# Patient Record
Sex: FEMALE | Race: WHITE | Employment: FULL TIME | URBAN - METROPOLITAN AREA
[De-identification: names, ages, dates, MRNs, and addresses within clinical notes are randomized per-mention and may not be internally consistent; named-entity substitution may affect disease eponyms.]

---

## 2016-10-04 LAB
CHLAMYDIA, EXTERNAL: NEGATIVE
HBSAG, EXTERNAL: NEGATIVE
HIV, EXTERNAL: NEGATIVE
N. GONORRHEA, EXTERNAL: NEGATIVE
RPR, EXTERNAL: NON REACTIVE
RUBELLA, EXTERNAL: NEGATIVE
TYPE, ABO & RH, EXTERNAL: NORMAL

## 2017-04-18 LAB — GRBS, EXTERNAL: NEGATIVE

## 2017-05-09 ENCOUNTER — HOSPITAL ENCOUNTER (INPATIENT)
Age: 27
LOS: 2 days | Discharge: HOME | End: 2017-05-12
Attending: OBSTETRICS & GYNECOLOGY | Admitting: OBSTETRICS & GYNECOLOGY
Payer: COMMERCIAL

## 2017-05-09 PROCEDURE — 99281 EMR DPT VST MAYX REQ PHY/QHP: CPT

## 2017-05-09 RX ORDER — GINKGO BILOBA LEAF EXTRACT 60 MG
CAPSULE ORAL
COMMUNITY

## 2017-05-09 NOTE — IP AVS SNAPSHOT
[address] [place] [address] 
[phone] Patient: Og No MRN: [id number] ICN:[id number] You are allergic to the following No active allergies Recent Documentation Height Weight Breastfeeding? BMI OB Status Smoking Status 1.524 m 62.6 kg Yes 26.95 kg/m2 Recent pregnancy Never Smoker Unresulted Labs Order Current Status TYPE & SCREEN Preliminary result Emergency Contacts Name Discharge Info Relation Home Work Mobile Michael Jeter DISCHARGE CAREGIVER [3] Spouse [3]   [phone] About your hospitalization You were admitted on:  May 9, 2017 You last received care in the:  [address] [place] [address] You were discharged on:  May 12, 2017 Unit phone number:  [phone] Why you were hospitalized Your primary diagnosis was:  Not on File Your diagnoses also included:  Pregnancy, Normal Labor And Delivery, Postpartum Care And Examination Providers Seen During Your Hospitalizations Provider Role Specialty Primary office phone Sue Hernandez MD Attending Provider Obstetrics & Gynecology [phone] Noemi Moreno MD Attending Provider Obstetrics & Gynecology [phone] Your Primary Care Physician (PCP) Primary Care Physician Office Phone Office Fax Wanda Juan, [address] [place] [address] [phone] Follow-up Information Follow up With Details Comments Contact Info [place]   [id number] [address] [place] [address] 
[phone] Current Discharge Medication List  
  
START taking these medications Dose & Instructions Dispensing Information Comments Morning Noon Evening Bedtime  
 ibuprofen 800 mg tablet Commonly known as:  MOTRIN Your last dose was: Your next dose is:    
   
   
 Dose:  800 mg Take 1 Tab by mouth every eight (8) hours as needed. Quantity:  50 Tab Refills:  0 CONTINUE these medications which have NOT CHANGED Dose & Instructions Dispensing Information Comments Morning Noon Evening Bedtime PRENATAL PO Your last dose was: Your next dose is: Take  by mouth. Refills:  0 ASK your doctor about these medications Dose & Instructions Dispensing Information Comments Morning Noon Evening Bedtime EVENING PRIMROSE 500 mg Cap Generic drug:  evening primrose oil Your last dose was: Your next dose is: Take  by mouth. Refills:  0 Where to Get Your Medications Information on where to get these meds will be given to you by the nurse or doctor. ! Ask your nurse or doctor about these medications  
  ibuprofen 800 mg tablet Discharge Instructions POST DELIVERY DISCHARGE INSTRUCTIONS Name: Tay Aguilar YOB: 1990 Primary Diagnosis: Active Problems: 
  Pregnancy (5/10/2017) Normal labor and delivery (5/11/2017) Postpartum care and examination (5/11/2017) General:  
 
Diet/Diet Restrictions: 
Eight 8-ounce glasses of fluid daily (water, juices); avoid excessive caffeine intake. Meals/snacks as desired which are high in fiber and carbohydrates and low in fat and cholesterol. Physical Activity / Restrictions / Safety:  
 
Avoid heavy lifting, no more than the baby alone (not the baby in the car seat). Avoid intercourse until you are seen at your postpartum visit. No douching or tampon use. Check with obstetrician before starting or resuming an exercise program.    
 
 
Discharge Instructions/Special Treatment/Home Care Needs:  
 
Continue prenatal vitamins. Continue to use squirt bottle with warm water on your perineum after each bathroom use until bleeding stops. Call your doctor for the following: Fever over 101 degrees by mouth. Vaginal bleeding that soaks two pads per hour for more than one hour. Red streaks or increased swelling of legs, painful red streaks on your breast. 
If you feel extremely anxious or overwhelmed. If you have thoughts of harming yourself and/or your baby. Pain Management:  
 
Pain Management:  
Take Acetaminophen (Tylenol) or Ibuprofen (Advil, Motrin), as directed for pain. Use a warm Sitz bath 3 times daily to relieve perineal or hemorrhoidal discomfort. For hemorrhoidal discomfort, use Tucks and Anusol cream as needed and directed. Follow-Up Care:  
 
Appointment with MD: Follow-up Appointments Procedures  FOLLOW UP VISIT Appointment in: 6 Weeks Standing Status:   Standing Number of Occurrences:   1 Order Specific Question:   Appointment in Answer:   6 Weeks Telephone number: [phone] Signed By: Paulina Pillai CNM Discharge Instructions Attachments/References BREASTFEEDING MOTHERS: NUTRITION (ENGLISH) SAFE SLEEP AND SUDDEN INFANT DEATH SYNDROME (SIDS): PEDIATRIC: GENERAL INFO (ENGLISH) PARENTING: STRESS AND INFANTS (ENGLISH) POSTPARTUM CARE (ENGLISH) DEPRESSION: POSTPARTUM (ENGLISH) Discharge Orders None [employer] Announcement We are excited to announce that we are making your provider's discharge notes available to you in [employer]. You will see these notes when they are completed and signed by the physician that discharged you from your recent hospital stay. If you have any questions or concerns about any information you see in S[employer], please call the Health Information Department where you were seen or reach out to your Primary Care Provider for more information about your plan of care. Introducing [facility] SERVICES!    
 Ashly Tijerina introduces [employer] patient portal. Now you can access parts of your medical record, email your doctor's office, and request medication refills online. 1. In your internet browser, go to [email or web address] 2. Click on the First Time User? Click Here link in the Sign In box. You will see the New Member Sign Up page. 3. Enter your [employer] [place] Access Code exactly as it appears below. You will not need to use this code after youve completed the sign-up process. If you do not sign up before the expiration date, you must request a new code. · [employer] [place] Access Code: [email or web address] Expires: 7/2/2017  1:25 PM 
 
4. Enter the last four digits of your Social Security Number (xxxx) and Date of Birth (mm/dd/yyyy) as indicated and click Submit. You will be taken to the next sign-up page. 5. Create a [employer] [place] ID. This will be your [employer] [place] login ID and cannot be changed, so think of one that is secure and easy to remember. 6. Create a [employer] [place] password. You can change your password at any time. 7. Enter your Password Reset Question and Answer. This can be used at a later time if you forget your password. 8. Enter your e-mail address. You will receive e-mail notification when new information is available in [address] 9. Click Sign Up. You can now view and download portions of your medical record. 10. Click the Download Summary menu link to download a portable copy of your medical information. If you have questions, please visit the Frequently Asked Questions section of the [employer] [place] website. Remember, [employer] [place] is NOT to be used for urgent needs. For medical emergencies, dial 911. Now available from your iPhone and Android! General Information Please provide this summary of care documentation to your next provider. Patient Signature:  ____________________________________________________________ Date:  ____________________________________________________________  
  
Mario Alberto Balderrama  Provider Signature: ____________________________________________________________ Date:  ____________________________________________________________ More Information Nutrition for Breastfeeding Mothers: Care Instructions Your Care Instructions When a woman breastfeeds her baby, she needs more nutrients to keep herself healthy and to make the baby's milk. Breastfeeding helps build the bond between you and your baby. It gives your baby excellent health benefits. A healthy diet includes eating a variety of foods from the basic food groups: grains, vegetables, fruits, milk and milk products (such as cheese and yogurt), and meat and dried beans. Eating well during breastfeeding will ensure that you stay healthy and your baby grows and develops normally. Follow-up care is a key part of your treatment and safety. Be sure to make and go to all appointments, and call your doctor if you are having problems. It's also a good idea to know your test results and keep a list of the medicines you take. How can you care for yourself at home? · Include 3 to 4 cups of nonfat or low-fat milk or milk products in your diet every day. These include: ¨ Milk (8 ounces equals 1 cup). ¨ Ice cream (1½ cups equals 1 cup of milk). ¨ Cheese (1½ ounces of cheese equals 1 cup). ¨ Yogurt (8 ounces equals 1 cup). · Eat at least 7 ounces of grains, such as cereals, breads, crackers, rice, or pasta, every day. One ounce is about 1 slice of bread, 1 cup of breakfast cereal, or ½ cup of cooked rice, cereal, or pasta. · Eat 3 cups of vegetables each day. Choices include: ¨ Dark-green vegetables such as broccoli and spinach. ¨ Orange vegetables such as carrots and sweet potatoes. ¨ Dried beans (such as sorenson and kidney beans) and peas (such as lentils). · Every day, eat 2 cups of fresh, frozen, or canned fruit.  
· Eat 6½ ounces each day of protein, such as chicken, fish, lean meat, eggs, peanut butter, dried beans and peas, nuts, and seeds. One egg, 1 tablespoon of peanut butter, or ½ ounce of nuts or seeds equals 1 ounce of protein. A ½ cup of cooked beans equals 2 ounces of protein. · Drink plenty of fluids, enough so that your urine is light yellow or clear like water. If you have kidney, heart, or liver disease and have to limit fluids, talk with your doctor before you increase the amount of fluids you drink. · Limit caffeine products, such as coffee, tea, chocolate, and some sodas. Caffeine can pass to your baby through breast milk. It may cause fussiness and sleep problems in babies. · Your doctor may recommend a vitamin supplement. Take it as recommended. · Consider joining a breastfeeding support group. These are offered at many hospitals and birthing centers by nurses, nurse-midwives, or lactation consultants. When should you call for help? Watch closely for changes in your health, and be sure to contact your doctor if: 
· You feel that you are not making enough milk for your baby. · You are losing a lot of weight. · You do not think your baby is gaining enough weight. · You would like help to plan a healthy diet. Where can you learn more? Go to http://dano-yassine.info/. Enter P234 in the search box to learn more about \"Nutrition for Breastfeeding Mothers: Care Instructions. \" Current as of: May 30, 2016 Content Version: 11.2 © 4037-8548 [employer]Rete. Care instructions adapted under license by [employer] (which disclaims liability or warranty for this information). If you have questions about a medical condition or this instruction, always ask your healthcare professional. [place] [address] any warranty or liability for your use of this information. Learning About Safe Sleep for Babies Why is safe sleep important?  
Enjoy your time with your baby, and know that you can do a few things to keep your baby safe. Following safe sleep guidelines can help prevent sudden infant death syndrome (SIDS) and reduce other sleep-related risks. SIDS is the death of a baby younger than 1 year with no known cause. Talk about these safety steps with your  providers, family, friends, and anyone else who spends time with your baby. Explain in detail what you expect them to do. Do not assume that people who care for your baby know these guidelines. What are the tips for safe sleep? Putting your baby to sleep · Put your baby to sleep on his or her back, not on the side or tummy. This reduces the risk of SIDS. · Once your baby learns to roll from the back to the belly, you do not need to keep shifting your baby onto his or her back. But keep putting your baby down to sleep on his or her back. · Keep the room at a comfortable temperature so that your baby can sleep in lightweight clothes without a blanket. Usually, the temperature is about right if an adult can wear a long-sleeved T-shirt and pants without feeling cold. Make sure that your baby doesn't get too warm. Your baby is likely too warm if he or she sweats or tosses and turns a lot. · Consider offering your baby a pacifier at nap time and bedtime if your doctor agrees. · The American Academy of Pediatrics recommends that you do not sleep with your baby in the bed with you. · When your baby is awake and someone is watching, allow your baby to spend some time on his or her belly. This helps your baby get strong and may help prevent flat spots on the back of the head. Cribs, cradles, bassinets, and bedding · For the first 6 months, have your baby sleep in a crib, cradle, or bassinet in the same room where you sleep. · Keep soft items and loose bedding out of the crib. Items such as blankets, stuffed animals, toys, and pillows could block your baby's mouth or trap your baby. Dress your baby in sleepers instead of using blankets. · Make sure that your baby's crib has a firm mattress (with a fitted sheet). Don't use bumper pads or other products that attach to crib slats or sides. They could block your baby's mouth or trap your baby. · Do not place your baby in a car seat, sling, swing, bouncer, or stroller to sleep. The safest place for a baby is in a crib, cradle, or bassinet that meets safety standards. What else is important to know? More about sudden infant death syndrome (SIDS) SIDS is very rare. In most cases, a parent or other caregiver puts the babywho seems healthydown to sleep and returns later to find that the baby has . No one is at fault when a baby dies of SIDS. A SIDS death cannot be predicted, and in many cases it cannot be prevented. Doctors do not know what causes SIDS. It seems to happen more often in premature and low-birth-weight babies. It also is seen more often in babies whose mothers did not get medical care during the pregnancy and in babies whose mothers smoke. Do not smoke or let anyone else smoke in the house or around your baby. Exposure to smoke increases the risk of SIDS. If you need help quitting, talk to your doctor about stop-smoking programs and medicines. These can increase your chances of quitting for good. Breastfeeding your child may help prevent SIDS. Be wary of products that are billed as helping prevent SIDS. Talk to your doctor before buying any product that claims to reduce SIDS risk. What to do while still pregnant · See your doctor regularly. Women who see a doctor early in and throughout their pregnancies are less likely to have babies who die of SIDS. · Eat a healthy, balanced diet, which can help prevent a premature baby or a baby with a low birth weight. · Do not smoke or let anyone else smoke in the house or around you. Smoking or exposure to smoke during pregnancy increases the risk of SIDS.  If you need help quitting, talk to your doctor about stop-smoking programs and medicines. These can increase your chances of quitting for good. · Do not drink alcohol or take illegal drugs. Alcohol or drug use may cause your baby to be born early. Follow-up care is a key part of your child's treatment and safety. Be sure to make and go to all appointments, and call your doctor if your child is having problems. It's also a good idea to know your child's test results and keep a list of the medicines your child takes. Where can you learn more? Go to http://dano-yassine.info/. Enter K072 in the search box to learn more about \"Learning About Safe Sleep for Babies. \" Current as of: July 26, 2016 Content Version: 11.2 © 8461-5091 [employer]. Care instructions adapted under license by [employer] (which disclaims liability or warranty for this information). If you have questions about a medical condition or this instruction, always ask your healthcare professional. [place] [address] any warranty or liability for your use of this information. Stress in Parents of Infants: Care Instructions Your Care Instructions Meeting the increased demands of being a new parent can be a big challenge. It is easy to get overtired and overwhelmed during the first weeks. What used to be a simple chore, such as buying groceries, is not so simple now. Plus, you have new chores, including feeding and changing your new baby. At the end of the day, you may be so tired that you feel like crying. Instead of looking forward to the next day, you may be dreading tomorrow. Like many new parents, you are burned out from the stress of having a new baby. Stress affects each of us differently, and the most effective ways to relieve it are different for each person. You can try different methods to find out which ones work best for you. As the weeks go by, you will begin to develop a rhythm with your baby.  Tasks that now seem to take forever will become easier. Many women get the \"baby blues\" during the first few days after childbirth. If you are a new mother and the \"baby blues\" last more than a few days, call your doctor right away. Depression is a medical condition that requires treatment. Follow-up care is a key part of your treatment and safety. Be sure to make and go to all appointments, and call your doctor if you are having problems. It's also a good idea to know your test results and keep a list of the medicines you take. How can you care for yourself at home? · Be kind to yourself. Your new baby takes a lot of work, but he or she can give you a lot of pleasure too. Do not worry about housekeeping for a while. · Allow your friends to bring you meals or do chores. · Limit visitors to as few as you feel you can handle, or ask them not to visit for a while. Before they come, set a limit on how long they will stay. · Sleep when your baby sleeps. Even a short nap helps. · Find what triggers your stress, and avoid those things as much as you can. · If you breastfeed, learn how to collect and store some breast milk so your partner or  can feed the baby while you sleep. · Eat a balanced diet so you can keep up your energy. · Drink plenty of fluids throughout the day. · Avoid caffeine and alcohol. Caffeine is found in coffee, tea, cola drinks, chocolate, and other foods. · Limit medicines that can make you more tired, such as tranquilizers and cold and allergy medicines. · Get regular daily exercise, such as walks, to help improve your mood. Rest after you exercise. · Be honest with yourself and those who care about you. Tell them you are stressed and tired. · Talking to other new parents can help. Ask your doctor or child's doctor to suggest support groups for new parents. Hearing that someone else is having the same experiences you are can help a lot.  
· If you have the baby blues for more than a few days, call your doctor right away. When should you call for help? Call 911 anytime you think you may need emergency care. For example, call if: 
· You have thoughts of hurting yourself, your baby, or another person. Call your doctor now or seek immediate medical care if: 
· You are having trouble caring for yourself or your baby. Watch closely for changes in your health, and be sure to contact your doctor if you have any problems. Where can you learn more? Go to http://dano-yassine.info/. Enter H142 in the search box to learn more about \"Stress in Parents of Infants: Care Instructions. \" Current as of: 2016 Content Version: 11.2 © 4341-4813 [employer]. Care instructions adapted under license by [employer] (which disclaims liability or warranty for this information). If you have questions about a medical condition or this instruction, always ask your healthcare professional. [place] [address] any warranty or liability for your use of this information. Postpartum: Care Instructions Your Care Instructions After childbirth (postpartum period), your body goes through many changes. Some of these changes happen over several weeks. In the hours after delivery, your body will begin to recover from childbirth while it prepares to breastfeed your . You may feel emotional during this time. Your hormones can shift your mood without warning for no clear reason. In the first couple of weeks after childbirth, many women have emotions that change from happy to sad. You may find it hard to sleep. You may cry a lot. This is called the \"baby blues. \" These overwhelming emotions often go away within a couple of days or weeks. But it's important to discuss your feelings with your doctor. It is easy to get too tired and overwhelmed during the first weeks after childbirth. Don't try to do too much.  Get rest whenever you can, accept help from others, and eat well and drink plenty of fluids. About 4 to 6 weeks after your baby's birth, you will have a follow-up visit with your doctor. This visit is your time to talk to your doctor about anything you are concerned or curious about. Follow-up care is a key part of your treatment and safety. Be sure to make and go to all appointments, and call your doctor if you are having problems. It's also a good idea to know your test results and keep a list of the medicines you take. How can you care for yourself at home? · Sleep or rest when your baby sleeps. · Get help with household chores from family or friends, if you can. Do not try to do it all yourself. · If you have hemorrhoids or swelling or pain around the opening of your vagina, try using cold and heat. You can put ice or a cold pack on the area for 10 to 20 minutes at a time. Put a thin cloth between the ice and your skin. Also try sitting in a few inches of warm water (sitz bath) 3 times a day and after bowel movements. · Take pain medicines exactly as directed. ¨ If the doctor gave you a prescription medicine for pain, take it as prescribed. ¨ If you are not taking a prescription pain medicine, ask your doctor if you can take an over-the-counter medicine. · Eat more fiber to avoid constipation. Include foods such as whole-grain breads and cereals, raw vegetables, raw and dried fruits, and beans. · Drink plenty of fluids, enough so that your urine is light yellow or clear like water. If you have kidney, heart, or liver disease and have to limit fluids, talk with your doctor before you increase the amount of fluids you drink. · Do not rinse inside your vagina with fluids (douche). · If you have stitches, keep the area clean by pouring or spraying warm water over the area outside your vagina and anus after you use the toilet. · Keep a list of questions to bring to your postpartum visit.  Your questions might be about: 
 ¨ Changes in your breasts, such as lumps or soreness. ¨ When to expect your menstrual period to start again. ¨ What form of birth control is best for you. ¨ Weight you have put on during the pregnancy. ¨ Exercise options. ¨ What foods and drinks are best for you, especially if you are breastfeeding. ¨ Problems you might be having with breastfeeding. ¨ When you can have sex. Some women may want to talk about lubricants for the vagina. ¨ Any feelings of sadness or restlessness that you are having. When should you call for help? Call 911 anytime you think you may need emergency care. For example, call if: 
· You have thoughts of harming yourself, your baby, or another person. · You passed out (lost consciousness). Call your doctor now or seek immediate medical care if: 
· Your vaginal bleeding seems to be getting heavier. · You are dizzy or lightheaded, or you feel like you may faint. · You have a fever. Watch closely for changes in your health, and be sure to contact your doctor if: 
· You have new or worse vaginal discharge. · You feel sad or depressed. · You are having problems with your breasts or breastfeeding. Where can you learn more? Go to http://dano-yassine.info/. Enter M157 in the search box to learn more about \"Postpartum: Care Instructions. \" Current as of: May 30, 2016 Content Version: 11.2 © 2342-2100 [employer]. Care instructions adapted under license by [employer] (which disclaims liability or warranty for this information). If you have questions about a medical condition or this instruction, always ask your healthcare professional. [place] [address] any warranty or liability for your use of this information. Depression After Childbirth: Care Instructions Your Care Instructions Many women get the \"baby blues\" during the first few days after childbirth. You may lose sleep, feel irritable, and cry easily. You may feel happy one minute and sad the next. Hormone changes are one cause of these emotional changes. Also, the demands of a new baby, along with visits from relatives or other family needs, add to a mother's stress. The \"baby blues\" often peak around the fourth day. Then they ease up in less than 2 weeks. If your moodiness or anxiety lasts for more than 2 weeks, or if you feel like life is not worth living, you may have postpartum depression. This is different for each mother. Some mothers with serious depression may worry intensely about their infant's well-being. Others may feel distant from their child. Some mothers might even feel that they might harm their baby. A mother may have signs of paranoia, wondering if someone is watching her. Depression is not a sign of weakness. It is a medical condition that requires treatment. Medicine and counseling often work well to reduce depression. Talk to your doctor about taking antidepressant medicine while breastfeeding. Follow-up care is a key part of your treatment and safety. Be sure to make and go to all appointments, and call your doctor if you are having problems. It's also a good idea to know your test results and keep a list of the medicines you take. How do you know if you are depressed? With all the changes in your life, you may not know if you are depressed. Pregnancy sometimes causes changes in how you feel that are similar to the symptoms of depression. Symptoms of depression include: · Feeling sad or hopeless and losing interest in daily activities. These are the most common symptoms of depression. · Sleeping too much or not enough. · Feeling tired. You may feel as if you have no energy. · Eating too much or too little. · Writing or talking about death, such as writing suicide notes or talking about guns, knives, or pills.  Keep the numbers for these national suicide hotlines: -TALK (5-406.596.9046) and 5-877-FAZCAXL (2-519.581.4752). If you or someone you know talks about suicide or feeling hopeless, get help right away. How can you care for yourself at home? · Be safe with medicines. Take your medicines exactly as prescribed. Call your doctor if you think you are having a problem with your medicine. · Eat a healthy diet so that you can keep up your energy. · Get regular daily exercise, such as walks, to help improve your mood. · Get as much sunlight as possible. Keep your shades and curtains open. Get outside as much as you can. · Avoid using alcohol or other substances to feel better. · Get as much rest and sleep as possible. Avoid doing too much. Being too tired can increase depression. · Play stimulating music throughout your day and soothing music at night. · Schedule outings and visits with friends and family. Ask them to call you regularly, so that you do not feel alone. · Ask for help with preparing food and other daily tasks. Family and friends are often happy to help a mother with a . · Be honest with yourself and those who care about you. Tell them about your struggle. · Join a support group of new mothers. No one can better understand the challenges of caring for a  than other new mothers. · If you feel like life is not worth living or are feeling hopeless, get help right away. Keep the numbers for these national suicide hotlines: -TALK (7-860.396.2829) and 4-744-JQQBFRZ (1-113.787.2302). When should you call for help? Call 911 anytime you think you may need emergency care. For example, call if: 
· You feel you cannot stop from hurting yourself, your baby, or someone else. Call your doctor now or seek immediate medical care if: 
· You are having trouble caring for yourself or your baby. · You hear voices. Watch closely for changes in your health, and be sure to contact your doctor if: · You have problems with your depression medicine. · You do not get better as expected. Where can you learn more? Go to http://dano-yassine.info/. Enter Y794 in the search box to learn more about \"Depression After Childbirth: Care Instructions. \" Current as of: July 26, 2016 Content Version: 11.2 © 7395-5327 [employer]. Care instructions adapted under license by [employer] (which disclaims liability or warranty for this information). If you have questions about a medical condition or this instruction, always ask your healthcare professional. [address] any warranty or liability for your use of this information.

## 2017-05-09 NOTE — IP AVS SNAPSHOT
Current Discharge Medication List  
  
START taking these medications Dose & Instructions Dispensing Information Comments Morning Noon Evening Bedtime  
 ibuprofen 800 mg tablet Commonly known as:  MOTRIN Your last dose was: Your next dose is:    
   
   
 Dose:  800 mg Take 1 Tab by mouth every eight (8) hours as needed. Quantity:  50 Tab Refills:  0 CONTINUE these medications which have NOT CHANGED Dose & Instructions Dispensing Information Comments Morning Noon Evening Bedtime PRENATAL PO Your last dose was: Your next dose is: Take  by mouth. Refills:  0 ASK your doctor about these medications Dose & Instructions Dispensing Information Comments Morning Noon Evening Bedtime EVENING PRIMROSE 500 mg Cap Generic drug:  evening primrose oil Your last dose was: Your next dose is: Take  by mouth. Refills:  0 Where to Get Your Medications Information on where to get these meds will be given to you by the nurse or doctor. ! Ask your nurse or doctor about these medications  
  ibuprofen 800 mg tablet

## 2017-05-10 ENCOUNTER — ANESTHESIA EVENT (OUTPATIENT)
Dept: LABOR AND DELIVERY | Age: 27
End: 2017-05-10
Payer: COMMERCIAL

## 2017-05-10 ENCOUNTER — ANESTHESIA (OUTPATIENT)
Dept: LABOR AND DELIVERY | Age: 27
End: 2017-05-10
Payer: COMMERCIAL

## 2017-05-10 PROBLEM — Z34.90 PREGNANCY: Status: ACTIVE | Noted: 2017-05-10

## 2017-05-10 LAB
BASOPHILS # BLD AUTO: 0 K/UL (ref 0–0.06)
BASOPHILS # BLD: 0 % (ref 0–2)
DIFFERENTIAL METHOD BLD: ABNORMAL
EOSINOPHIL # BLD: 0 K/UL (ref 0–0.4)
EOSINOPHIL NFR BLD: 0 % (ref 0–5)
ERYTHROCYTE [DISTWIDTH] IN BLOOD BY AUTOMATED COUNT: 13.3 % (ref 11.6–14.5)
ERYTHROCYTE [DISTWIDTH] IN BLOOD BY AUTOMATED COUNT: 13.4 % (ref 11.6–14.5)
HCT VFR BLD AUTO: 37.5 % (ref 35–45)
HCT VFR BLD AUTO: 38.8 % (ref 35–45)
HGB BLD-MCNC: 13 G/DL (ref 12–16)
HGB BLD-MCNC: 13.8 G/DL (ref 12–16)
LYMPHOCYTES # BLD AUTO: 12 % (ref 21–52)
LYMPHOCYTES # BLD: 1.6 K/UL (ref 0.9–3.6)
MCH RBC QN AUTO: 34.8 PG (ref 24–34)
MCH RBC QN AUTO: 35.2 PG (ref 24–34)
MCHC RBC AUTO-ENTMCNC: 34.7 G/DL (ref 31–37)
MCHC RBC AUTO-ENTMCNC: 35.6 G/DL (ref 31–37)
MCV RBC AUTO: 100.3 FL (ref 74–97)
MCV RBC AUTO: 99 FL (ref 74–97)
MONOCYTES # BLD: 0.9 K/UL (ref 0.05–1.2)
MONOCYTES NFR BLD AUTO: 7 % (ref 3–10)
NEUTS SEG # BLD: 10.8 K/UL (ref 1.8–8)
NEUTS SEG NFR BLD AUTO: 81 % (ref 40–73)
PLATELET # BLD AUTO: 140 K/UL (ref 135–420)
PLATELET # BLD AUTO: 161 K/UL (ref 135–420)
PMV BLD AUTO: 11.8 FL (ref 9.2–11.8)
PMV BLD AUTO: 11.8 FL (ref 9.2–11.8)
RBC # BLD AUTO: 3.74 M/UL (ref 4.2–5.3)
RBC # BLD AUTO: 3.92 M/UL (ref 4.2–5.3)
WBC # BLD AUTO: 13.3 K/UL (ref 4.6–13.2)
WBC # BLD AUTO: 17.2 K/UL (ref 4.6–13.2)

## 2017-05-10 PROCEDURE — 74011250636 HC RX REV CODE- 250/636: Performed by: ADVANCED PRACTICE MIDWIFE

## 2017-05-10 PROCEDURE — 74011250636 HC RX REV CODE- 250/636: Performed by: ANESTHESIOLOGY

## 2017-05-10 PROCEDURE — 0HQ9XZZ REPAIR PERINEUM SKIN, EXTERNAL APPROACH: ICD-10-PCS | Performed by: OBSTETRICS & GYNECOLOGY

## 2017-05-10 PROCEDURE — 74011250636 HC RX REV CODE- 250/636

## 2017-05-10 PROCEDURE — 74011250637 HC RX REV CODE- 250/637

## 2017-05-10 PROCEDURE — 86900 BLOOD TYPING SEROLOGIC ABO: CPT | Performed by: ADVANCED PRACTICE MIDWIFE

## 2017-05-10 PROCEDURE — 86920 COMPATIBILITY TEST SPIN: CPT | Performed by: ADVANCED PRACTICE MIDWIFE

## 2017-05-10 PROCEDURE — 36415 COLL VENOUS BLD VENIPUNCTURE: CPT | Performed by: ADVANCED PRACTICE MIDWIFE

## 2017-05-10 PROCEDURE — 77010026065 HC OXYGEN MINIMUM MEDICAL AIR

## 2017-05-10 PROCEDURE — 65270000029 HC RM PRIVATE

## 2017-05-10 PROCEDURE — 75410000000 HC DELIVERY VAGINAL/SINGLE

## 2017-05-10 PROCEDURE — 75410000002 HC LABOR FEE PER 1 HR

## 2017-05-10 PROCEDURE — 76060000078 HC EPIDURAL ANESTHESIA

## 2017-05-10 PROCEDURE — 00HU33Z INSERTION OF INFUSION DEVICE INTO SPINAL CANAL, PERCUTANEOUS APPROACH: ICD-10-PCS | Performed by: ANESTHESIOLOGY

## 2017-05-10 PROCEDURE — 75410000003 HC RECOV DEL/VAG/CSECN EA 0.5 HR

## 2017-05-10 PROCEDURE — 74011000250 HC RX REV CODE- 250

## 2017-05-10 PROCEDURE — 85027 COMPLETE CBC AUTOMATED: CPT | Performed by: NURSE PRACTITIONER

## 2017-05-10 PROCEDURE — 88307 TISSUE EXAM BY PATHOLOGIST: CPT | Performed by: OBSTETRICS & GYNECOLOGY

## 2017-05-10 PROCEDURE — 74011250637 HC RX REV CODE- 250/637: Performed by: NURSE PRACTITIONER

## 2017-05-10 PROCEDURE — 85025 COMPLETE CBC W/AUTO DIFF WBC: CPT | Performed by: ADVANCED PRACTICE MIDWIFE

## 2017-05-10 PROCEDURE — 3E0R3CZ INTRODUCE REGIONAL ANESTH IN SPINAL CANAL, PERC: ICD-10-PCS | Performed by: OBSTETRICS & GYNECOLOGY

## 2017-05-10 PROCEDURE — 74011250637 HC RX REV CODE- 250/637: Performed by: ADVANCED PRACTICE MIDWIFE

## 2017-05-10 PROCEDURE — 77030007879 HC KT SPN EPDRL TELE -B: Performed by: ANESTHESIOLOGY

## 2017-05-10 RX ORDER — SODIUM CHLORIDE 9 MG/ML
250 INJECTION, SOLUTION INTRAVENOUS AS NEEDED
Status: DISCONTINUED | OUTPATIENT
Start: 2017-05-10 | End: 2017-05-12 | Stop reason: HOSPADM

## 2017-05-10 RX ORDER — FENTANYL/ROPIVACAINE/NS/PF 2MCG/ML-.1
PLASTIC BAG, INJECTION (ML) EPIDURAL
Status: COMPLETED
Start: 2017-05-10 | End: 2017-05-10

## 2017-05-10 RX ORDER — OXYTOCIN IN 5 % DEXTROSE 30/500 ML
.5-2 PLASTIC BAG, INJECTION (ML) INTRAVENOUS
Status: DISCONTINUED | OUTPATIENT
Start: 2017-05-10 | End: 2017-05-12 | Stop reason: HOSPADM

## 2017-05-10 RX ORDER — PROMETHAZINE HYDROCHLORIDE 25 MG/ML
25 INJECTION, SOLUTION INTRAMUSCULAR; INTRAVENOUS
Status: DISCONTINUED | OUTPATIENT
Start: 2017-05-10 | End: 2017-05-12 | Stop reason: HOSPADM

## 2017-05-10 RX ORDER — LIDOCAINE HYDROCHLORIDE AND EPINEPHRINE 15; 5 MG/ML; UG/ML
INJECTION, SOLUTION EPIDURAL AS NEEDED
Status: DISCONTINUED | OUTPATIENT
Start: 2017-05-10 | End: 2017-05-10 | Stop reason: HOSPADM

## 2017-05-10 RX ORDER — OXYTOCIN/RINGER'S LACTATE 20/1000 ML
500 PLASTIC BAG, INJECTION (ML) INTRAVENOUS ONCE
Status: COMPLETED | OUTPATIENT
Start: 2017-05-10 | End: 2017-05-10

## 2017-05-10 RX ORDER — IBUPROFEN 400 MG/1
800 TABLET ORAL
Status: DISCONTINUED | OUTPATIENT
Start: 2017-05-10 | End: 2017-05-12 | Stop reason: HOSPADM

## 2017-05-10 RX ORDER — LIDOCAINE HYDROCHLORIDE 10 MG/ML
20 INJECTION, SOLUTION EPIDURAL; INFILTRATION; INTRACAUDAL; PERINEURAL AS NEEDED
Status: DISCONTINUED | OUTPATIENT
Start: 2017-05-10 | End: 2017-05-10 | Stop reason: HOSPADM

## 2017-05-10 RX ORDER — PHENYLEPHRINE HCL IN 0.9% NACL 0.4MG/10ML
80 SYRINGE (ML) INTRAVENOUS AS NEEDED
Status: DISCONTINUED | OUTPATIENT
Start: 2017-05-10 | End: 2017-05-10 | Stop reason: HOSPADM

## 2017-05-10 RX ORDER — MISOPROSTOL 100 UG/1
1000 TABLET ORAL ONCE
Status: COMPLETED | OUTPATIENT
Start: 2017-05-10 | End: 2017-05-10

## 2017-05-10 RX ORDER — FENTANYL CITRATE 50 UG/ML
INJECTION, SOLUTION INTRAMUSCULAR; INTRAVENOUS AS NEEDED
Status: DISCONTINUED | OUTPATIENT
Start: 2017-05-10 | End: 2017-05-10 | Stop reason: HOSPADM

## 2017-05-10 RX ORDER — HYDROMORPHONE HYDROCHLORIDE 1 MG/ML
1 INJECTION, SOLUTION INTRAMUSCULAR; INTRAVENOUS; SUBCUTANEOUS
Status: DISCONTINUED | OUTPATIENT
Start: 2017-05-10 | End: 2017-05-10 | Stop reason: HOSPADM

## 2017-05-10 RX ORDER — SODIUM CHLORIDE 0.9 % (FLUSH) 0.9 %
5-10 SYRINGE (ML) INJECTION EVERY 8 HOURS
Status: DISCONTINUED | OUTPATIENT
Start: 2017-05-10 | End: 2017-05-10 | Stop reason: HOSPADM

## 2017-05-10 RX ORDER — MINERAL OIL
30 OIL (ML) ORAL AS NEEDED
Status: COMPLETED | OUTPATIENT
Start: 2017-05-10 | End: 2017-05-10

## 2017-05-10 RX ORDER — SODIUM CHLORIDE, SODIUM LACTATE, POTASSIUM CHLORIDE, CALCIUM CHLORIDE 600; 310; 30; 20 MG/100ML; MG/100ML; MG/100ML; MG/100ML
125 INJECTION, SOLUTION INTRAVENOUS CONTINUOUS
Status: DISCONTINUED | OUTPATIENT
Start: 2017-05-10 | End: 2017-05-10 | Stop reason: HOSPADM

## 2017-05-10 RX ORDER — OXYCODONE AND ACETAMINOPHEN 5; 325 MG/1; MG/1
2 TABLET ORAL
Status: DISCONTINUED | OUTPATIENT
Start: 2017-05-10 | End: 2017-05-12 | Stop reason: HOSPADM

## 2017-05-10 RX ORDER — METHYLERGONOVINE MALEATE 0.2 MG/ML
0.2 INJECTION INTRAVENOUS AS NEEDED
Status: COMPLETED | OUTPATIENT
Start: 2017-05-10 | End: 2017-05-10

## 2017-05-10 RX ORDER — LIDOCAINE HYDROCHLORIDE 10 MG/ML
INJECTION INFILTRATION; PERINEURAL
Status: DISPENSED
Start: 2017-05-10 | End: 2017-05-10

## 2017-05-10 RX ORDER — FENTANYL CITRATE 50 UG/ML
INJECTION, SOLUTION INTRAMUSCULAR; INTRAVENOUS
Status: DISPENSED
Start: 2017-05-10 | End: 2017-05-10

## 2017-05-10 RX ORDER — OXYTOCIN IN 5 % DEXTROSE 30/500 ML
PLASTIC BAG, INJECTION (ML) INTRAVENOUS
Status: COMPLETED
Start: 2017-05-10 | End: 2017-05-10

## 2017-05-10 RX ORDER — SODIUM CHLORIDE 0.9 % (FLUSH) 0.9 %
5-10 SYRINGE (ML) INJECTION AS NEEDED
Status: DISCONTINUED | OUTPATIENT
Start: 2017-05-10 | End: 2017-05-10 | Stop reason: HOSPADM

## 2017-05-10 RX ORDER — NALOXONE HYDROCHLORIDE 0.4 MG/ML
0.2 INJECTION, SOLUTION INTRAMUSCULAR; INTRAVENOUS; SUBCUTANEOUS AS NEEDED
Status: DISCONTINUED | OUTPATIENT
Start: 2017-05-10 | End: 2017-05-10 | Stop reason: HOSPADM

## 2017-05-10 RX ORDER — OXYTOCIN/RINGER'S LACTATE 20/1000 ML
125 PLASTIC BAG, INJECTION (ML) INTRAVENOUS CONTINUOUS
Status: DISCONTINUED | OUTPATIENT
Start: 2017-05-10 | End: 2017-05-10 | Stop reason: HOSPADM

## 2017-05-10 RX ORDER — TERBUTALINE SULFATE 1 MG/ML
0.25 INJECTION SUBCUTANEOUS
Status: DISCONTINUED | OUTPATIENT
Start: 2017-05-10 | End: 2017-05-10 | Stop reason: HOSPADM

## 2017-05-10 RX ORDER — AMOXICILLIN 250 MG
1 CAPSULE ORAL
Status: DISCONTINUED | OUTPATIENT
Start: 2017-05-10 | End: 2017-05-12 | Stop reason: HOSPADM

## 2017-05-10 RX ORDER — FENTANYL/ROPIVACAINE/NS/PF 2MCG/ML-.1
1-15 PLASTIC BAG, INJECTION (ML) EPIDURAL
Status: DISCONTINUED | OUTPATIENT
Start: 2017-05-10 | End: 2017-05-10 | Stop reason: HOSPADM

## 2017-05-10 RX ORDER — BUTORPHANOL TARTRATE 2 MG/ML
2 INJECTION INTRAMUSCULAR; INTRAVENOUS
Status: DISCONTINUED | OUTPATIENT
Start: 2017-05-10 | End: 2017-05-10 | Stop reason: HOSPADM

## 2017-05-10 RX ORDER — METHYLERGONOVINE MALEATE 0.2 MG/1
200 TABLET ORAL EVERY 6 HOURS
Status: COMPLETED | OUTPATIENT
Start: 2017-05-10 | End: 2017-05-11

## 2017-05-10 RX ORDER — NALBUPHINE HYDROCHLORIDE 10 MG/ML
10 INJECTION, SOLUTION INTRAMUSCULAR; INTRAVENOUS; SUBCUTANEOUS
Status: DISCONTINUED | OUTPATIENT
Start: 2017-05-10 | End: 2017-05-10 | Stop reason: HOSPADM

## 2017-05-10 RX ORDER — LIDOCAINE HYDROCHLORIDE 10 MG/ML
INJECTION INFILTRATION; PERINEURAL AS NEEDED
Status: DISCONTINUED | OUTPATIENT
Start: 2017-05-10 | End: 2017-05-10 | Stop reason: HOSPADM

## 2017-05-10 RX ORDER — FENTANYL CITRATE 50 UG/ML
100 INJECTION, SOLUTION INTRAMUSCULAR; INTRAVENOUS ONCE
Status: ACTIVE | OUTPATIENT
Start: 2017-05-10 | End: 2017-05-10

## 2017-05-10 RX ORDER — BUPIVACAINE HYDROCHLORIDE 2.5 MG/ML
INJECTION, SOLUTION EPIDURAL; INFILTRATION; INTRACAUDAL AS NEEDED
Status: DISCONTINUED | OUTPATIENT
Start: 2017-05-10 | End: 2017-05-10 | Stop reason: HOSPADM

## 2017-05-10 RX ORDER — ZOLPIDEM TARTRATE 5 MG/1
5 TABLET ORAL
Status: DISCONTINUED | OUTPATIENT
Start: 2017-05-10 | End: 2017-05-12 | Stop reason: HOSPADM

## 2017-05-10 RX ORDER — ACETAMINOPHEN 325 MG/1
650 TABLET ORAL
Status: DISCONTINUED | OUTPATIENT
Start: 2017-05-10 | End: 2017-05-12 | Stop reason: HOSPADM

## 2017-05-10 RX ADMIN — Medication 10000 MILLI-UNITS/HR: at 12:16

## 2017-05-10 RX ADMIN — MISOPROSTOL 1000 MCG: 100 TABLET ORAL at 12:35

## 2017-05-10 RX ADMIN — IBUPROFEN 800 MG: 400 TABLET, FILM COATED ORAL at 20:37

## 2017-05-10 RX ADMIN — Medication 10 ML/HR: at 03:06

## 2017-05-10 RX ADMIN — Medication 2 MILLI-UNITS/MIN: at 10:34

## 2017-05-10 RX ADMIN — ACETAMINOPHEN 650 MG: 325 TABLET, FILM COATED ORAL at 18:29

## 2017-05-10 RX ADMIN — METHYLERGONOVINE MALEATE 200 MCG: 0.2 TABLET ORAL at 20:10

## 2017-05-10 RX ADMIN — Medication 10 ML/HR: at 08:38

## 2017-05-10 RX ADMIN — SODIUM CHLORIDE, SODIUM LACTATE, POTASSIUM CHLORIDE, AND CALCIUM CHLORIDE 125 ML/HR: 600; 310; 30; 20 INJECTION, SOLUTION INTRAVENOUS at 02:15

## 2017-05-10 RX ADMIN — SODIUM CHLORIDE, SODIUM LACTATE, POTASSIUM CHLORIDE, AND CALCIUM CHLORIDE 125 ML/HR: 600; 310; 30; 20 INJECTION, SOLUTION INTRAVENOUS at 04:46

## 2017-05-10 RX ADMIN — METHYLERGONOVINE MALEATE 0.2 MG: 0.2 INJECTION, SOLUTION INTRAMUSCULAR; INTRAVENOUS at 12:33

## 2017-05-10 RX ADMIN — METHYLERGONOVINE MALEATE 200 MCG: 0.2 TABLET ORAL at 13:54

## 2017-05-10 RX ADMIN — LIDOCAINE HYDROCHLORIDE 3 ML: 10 INJECTION INFILTRATION; PERINEURAL at 02:48

## 2017-05-10 RX ADMIN — BUPIVACAINE HYDROCHLORIDE 10 ML: 2.5 INJECTION, SOLUTION EPIDURAL; INFILTRATION; INTRACAUDAL at 02:51

## 2017-05-10 RX ADMIN — Medication 30 ML: at 11:03

## 2017-05-10 RX ADMIN — FENTANYL CITRATE 100 MCG: 50 INJECTION, SOLUTION INTRAMUSCULAR; INTRAVENOUS at 02:55

## 2017-05-10 RX ADMIN — LIDOCAINE HYDROCHLORIDE AND EPINEPHRINE 3 ML: 15; 5 INJECTION, SOLUTION EPIDURAL at 02:54

## 2017-05-10 RX ADMIN — Medication 125 ML/HR: at 15:37

## 2017-05-10 RX ADMIN — BUTORPHANOL TARTRATE 2 MG: 2 INJECTION, SOLUTION INTRAMUSCULAR; INTRAVENOUS at 02:20

## 2017-05-10 NOTE — PROGRESS NOTES
Labor Progress Note  Patient seen, fetal heart rate and contraction pattern evaluated, patient examined. Patient Vitals for the past 1 hrs:   Temp Resp   05/10/17 0946 98.5 °F (36.9 °C) 16       Physical Exam:  Cervical Exam:  9 cm dilated    100% effaced    +2 station    Membranes:  Spontaneous Rupture of Membranes;  Amniotic Fluid: clear fluid  Uterine Activity: Frequency: Every 2-4 minutes but no longer strong  Fetal Heart Rate: Reactive    Assessment/Plan:  Reassuring fetal status, Continue plan for vaginal delivery

## 2017-05-10 NOTE — LACTATION NOTE
Infant latched and nursing well with breast sandwiching at [phone] for 40 minutes between both breasts. Breastfeeding basics and log sheet discussed. Also discussed the safety concerns of using \"[employer]'s nipple cream\" as it has several food products that could be considered as allergy triggers for infant. Mom states she doesn't want to use lanolin cream \"as it is an animal product, but did hear that it is safer if infant has a reaction then you know what ingredient caused it. \" Mom will consider using lanolin cream if nipples become sore.

## 2017-05-10 NOTE — PROGRESS NOTES
Delivery Summary    Patient: Nancy Fair MRN: [id number]  SSN: [id number]    YOB: 1990  Age: 31 y.o. Sex: female        Information for the patient's :  Kirti Marcus [[id number]]       Labor Events:    Labor: No   Rupture Date: 2017   Rupture Time: 12:15 PM   Rupture Type SROM   Amniotic Fluid Volume: Large    Amniotic Fluid Description: Clear None   Induction: None       Augmentation: Oxytocin   Labor Events: None     Cervical Ripening:     None     Delivery Events:  Episiotomy: None   Laceration(s): First degree perineal     Repaired: Yes    Number of Repair Packets: 1   Suture Type and Size: Vicryl 2-0     Estimated Blood Loss (ml):  ml       Delivery Date: 5/10/2017    Delivery Time: 12:16 PM  Delivery Type: Vaginal, Spontaneous Delivery  Sex:  Female     Gestational Age: 37w11d   Delivery Clinician:  Jamila Romero  Living Status: Yes   Delivery Location: L&D            APGARS  One minute Five minutes Ten minutes   Skin color: 0   1   1     Heart rate: 1   2   2     Grimace: 0   2   2     Muscle tone: 0   2   2     Breathin   2   2     Totals: 1   9   9         Presentation: Vertex    Position: Left Occiput Anterior  Resuscitation Method:  Suctioning-bulb;PPV; Tactile Stimulation     Meconium Stained: None      Cord Vessels: 3 Vessels      Cord Events:    Cord Blood Sent?:  No    Blood Gases Sent?:  No    Placenta:  Date/Time: 5/10 12:27 PM  Removal: Spontaneous      Appearance: Normal;Intact     Bailey Measurements:  Birth Weight:        Birth Length:        Head Circumference:        Chest Circumference:       Abdominal Girth: Other Providers:   Leonid GRIFFIN;NICO VARELA;;PREET UMNAZOR;ARNOLDO SEPULVEDA;JEREMY SAPP D, Midwife;Primary Nurse;Primary Bailey Nurse;Nicu Nurse;Nurse Practitioner; Anesthesiologist;Nursery Nurse           Labor Events:    Labor: No   Rupture Date: 2017   Rupture Time: 12:15 PM   Rupture Type SROM   Amniotic Fluid Volume:      Amniotic Fluid Description: Clear   Induction:        Augmentation:    Labor Events:       Cervical Ripening:   None     Delivery Events:  Episiotomy: None   Laceration(s): First degree perineal     Repaired: Yes for hemostasis only   Number of Repair Packets: 1   Suture Type and Size:    2-) vicryl    Estimated Blood Loss (ml): 800 ml   Spontaneous vaginal delivery with tight nuchal cord x 2 which was reduced over fetal head. Infant placed on mother while cord clamped and cut then taken to warmer for nursery staff to evaluate. Infant cry with stimulation. Placenta removed with gental traction, intact with 3VC. Upon removal of placenta pt began heavy vaginal bleeding and was given methergine IM and then 1000 cytotec per rectum and Dr. Wang Roles notified and on her way in. With medication and fundal massage bleeding slowed to steady trickle, ultrasound done and no placental tissue noted, manual exam per Dr Wang Roles done to remove several moderate clots. Fundus remains firm, will have nursing staff continue to monitor closely and administer methergine q 6 hours x4.   Blood Type: A POSITIVE  Rubella: immune  Group B Strep: No results found for: GRBSEXT;  Treatment was not required  Harshad Hodges CNM

## 2017-05-10 NOTE — PROGRESS NOTES
0215: Anesthesia paged for epidural.  0217: Dr. Cheryl Swanson returned page and is on his way to hospital.

## 2017-05-10 NOTE — H&P
History & Physical    Name: Manisha Schneider MRN: [id number]  SSN: [id number]    YOB: 1990  Age: 31 y.o. Sex: female        Subjective:     Estimated Date of Delivery: 17  OB History      Para Term  AB TAB SAB Ectopic Multiple Living    1 0                    Ms. Lael Sandhoff is admitted with pregnancy at 39w6d for active labor. Prenatal course was normal. Please see prenatal records for details. No Known Allergies    Objective:     Vitals:  Vitals:    05/10/17 0801 05/10/17 0803 05/10/17 0808 05/10/17 0813   BP: 107/77      Pulse: (!) 106      Resp:       Temp:       SpO2:  99% 99% 100%   Weight:       Height:            Physical Exam:  Patient without distress. Heart: Regular rate and rhythm  Lung: clear to auscultation throughout lung fields, no wheezes, no rales, no rhonchi and normal respiratory effort  Abdomen: soft, nontender  Fundus: soft and non tender  Perineum: blood absent, amniotic fluid present  Cervical Exam: 9 cm dilated    100% effaced    +1 station    Lower Extremities: no evidence of DVT  presenting part cephalic  Membranes:  Spontaneous Rupture of Membranes; Amniotic Fluid: clear fluid  Fetal Heart Rate & Contraction pattern: Reactive    Prenatal Labs:   Lab Results   Component Value Date/Time    Rubella, External NEGATIVE 10/04/2016    HBsAg, External NEGATIVE 10/04/2016    HIV, External NEGATIVE 10/04/2016    RPR, External NON REACTIVE 10/04/2016    Gonorrhea, External NEGATIVE 10/04/2016    Chlamydia, External NEGATIVE 10/04/2016         Assessment/Plan:     Plan: Admit for Reassuring fetal status, Labor  Progressing normally, Continue plan for vaginal delivery. Group B Strep was negative.     Signed By:  Larisa Goodrich CNM     May 10, 2017

## 2017-05-10 NOTE — ANESTHESIA PREPROCEDURE EVALUATION
Anesthetic History   No history of anesthetic complications            Review of Systems / Medical History  Patient summary reviewed, nursing notes reviewed and pertinent labs reviewed    Pulmonary  Within defined limits                 Neuro/Psych   Within defined limits           Cardiovascular  Within defined limits                Exercise tolerance: >4 METS     GI/Hepatic/Renal  Within defined limits              Endo/Other  Within defined limits           Other Findings              Physical Exam    Airway  Mallampati: II  TM Distance: 4 - 6 cm  Neck ROM: normal range of motion   Mouth opening: Normal     Cardiovascular               Dental  No notable dental hx       Pulmonary                 Abdominal  GI exam deferred       Other Findings            Anesthetic Plan    ASA: 2  Anesthesia type: epidural            Anesthetic plan and risks discussed with: Patient

## 2017-05-10 NOTE — PROGRESS NOTES
4493 Bedside and Verbal shift change report given to Vonita Fothergill, RN (oncoming nurse) by Dread Faulkner RN (offgoing nurse). Report included the following information SBAR, Kardex, Intake/Output and MAR.     07 patient resting with family at bedside. Patient states she is requesting C. Judy Johnson for delivery if available. 730 CHAU Rizzo CNM paged. CNM informed that patient requesting C. Judy Harrelizabeth if available, Sydnee Carranza CNM on her way. Rn will page C. Judy Johnson to check availability. 34 C. Judy Johnson paged. 0745 SVE per request of C. Judy Johnson -/+1. Patient informed C. Judy Johnson is available for delivery. Patient assisted to High fowlers, US and toco adjusted. 0827 patient assisted to left lateral with peanut ball placed between legs. Us and toco adjusted, patients states she is comfortable. Denies needs. 4255 C. Judy Johnson at bedside. 0850 C. Judy Harrelizabeth at bedside with patient. FHT discussed, POC discussed. Patient coping well with pressure during ctx. FHT reviewed. 3667 anterior lip per C. Judy Harrelizabeth. Will practice pushing. 5953 patient turned to left lateral with peanut ball. Us and toco adjusted. 1012 Anterior lip per CNM,  Start practice pushing in side lying position    1015 Patient turned on her back. Practice pushing, ctx palpate moderate. 1020 orders received to start pitocin, will allow patient to labor down. 200 Rn and CNM at bedside. 1045 patient pushing with RN and CNM at bedside. Rn and CNM continuously evaluating  [address] until delivery. 1046 Beltran catheter removed. 800ml out    1123 patient continues to push well. FHT reviewed continuously at bedside by CNM and RN    1139 O2 applied at 10L/min via nonrebreather between contractions. 5  of viable female infant, infant placed on mothers abdomen, bulb suction and tactile stimulation applied.  No spontaneous cry noted by 40 seconds of life, cord clamped and cut by CNM and infant taken to radiant warmer for assessment and intervention. 1217 Augmentation pitocin bolusing. 1231 Skin to skin with mother. 1232 Spontaneous delivery of intact normal looking placenta    1234 Orders received to give IM methergine     8616 Orders received to give 1000mcg rectal cytotec. 1244:  2nd IV placed in left wrist.    1246 breastfeeding assistance provided by KRIS Leonard    1257:  Dr. Selina Vo at bedside. Order has been placed for 2 units of blood type and cross and held - blood bank notified. 1302: No retained tissue per manual exam & bedside US per Dr. Selina Vo. [address] provided, pad and icepack applied. 1312 breastfeeding assistance provided. Infant latched to right breast    1324 infant remains breastfeeding, patient informed to call nurse if she notes large gush of bleeding, dizziness, lightheadedness. Patient denies needs. [place] 5 Jeferson Machuca at bedside, patient declines infant bath at this time. 1530 patient up to void. Voided without difficulty. Pericare provided, gown pads and icepack changed. Don Silva text paged H&H results. 1610 Bedside and Verbal shift change report given to FERNANDEZ Decker LPN (oncoming nurse) by Kanchan Wilson RN (offgoing nurse). Report included the following information SBAR, Kardex, Intake/Output and MAR.

## 2017-05-10 NOTE — ANESTHESIA PROCEDURE NOTES
Epidural Block    Start time: 5/10/2017 2:44 AM  End time: 5/10/2017 2:58 AM  Performed by: Fernando Salazar  Authorized by: Cortney HOOKER     Pre-Procedure  Indication: labor epidural    Preanesthetic Checklist: patient identified, risks and benefits discussed, anesthesia consent, patient being monitored, timeout performed and anesthesia consent      Epidural:   Patient position:  Seated  Prep region:  Lumbar  Prep: Chlorhexidine    Location:  L3-4    Needle and Epidural Catheter:   Needle Type:  Tuohy  Needle Gauge:  17 G  Injection Technique:  Loss of resistance using air  Attempts:  1  Catheter Size:  20 G  Catheter at Skin Depth (cm):  9  Events: no blood with aspiration, no cerebrospinal fluid with aspiration, no paresthesia and negative aspiration test    Test Dose:  Negative and lidocaine 1.5% w/ epi    Assessment:   Catheter Secured:  Tegaderm and tape  Insertion:  Uncomplicated  Patient tolerance:  Patient tolerated the procedure well with no immediate complications

## 2017-05-10 NOTE — PROGRESS NOTES
Assisted pt to bathroom. Voided 400 ml without diff. Nola-care instructions given. Ice, tucks and spray given. Denies needs.

## 2017-05-10 NOTE — ROUTINE PROCESS
Bedside and Verbal shift change report given to CRICKET Mary RN  (oncoming nurse) by FERNANDEZ Decker LPN (offgoing nurse). Report given with SBAR, Kardex, Intake/Output, MAR and Recent Results.

## 2017-05-10 NOTE — PROGRESS NOTES
Pt arrived to L&D with complaints of contractions and possible SROM at 1215 this afternoon. Pt states she had a big gush of clear fluid. Pt is a  39.5wks. Pt denies any vaginal bleeding or discharge. Pt reports postive fetal movement. Pt taken to BR 6 and given gown to change into. 2318: EFM monitor applied    2327: Nitrazine positive. SVE: 3-4/90/0    2336: FERNANDEZ Gillette. CNM paged through [address]: FERNANDEZ Guerrero paged back promptly. Informed CNM of pt arrival and complaints of contractions and SROM. Pt is ruptured. Nitrazine positive. Pt has been ruptured since 1215 this afternoon. SVE: 3-4/90/0. Pt is reece every 5-7 minutes. GBS negative per pt. TO to admit pt and start pitocin at 2am if unchanged. 2348: POC reviewed with pt. Pt verbalized understanding    2350: Consents signed    3420: Strip reactive and reassuring. Verified with Milli Damioc RN. Pt taken off monitor. 0005: PIV placed to R wrist. IV saline locked    0020: Head to toe assessment performed. 0025: Lab at bedside for admission labs    0052: Pt reapplied to monitor. 0122: Strip reactive and reassuring. Verified with Milli Damico RN. Pt removed from EFM    0200: SVE: 6-/+1    0210: Pt requesting epidural    0213: EFM monitor reapplied    0215: Pt attached to LR. LR infusing at this time. 0220: 2mg stadol given for 9/10 pain    0244: Dr. River Onofre at bedside for epidural    0246: Pt sitting up for epidural    0247: Time out performed. 4972: Epidural catheter placed    0253: Test dose administered    0254: Loading dose administered. Fentanyl given to Dr. Jones Payment: Pt laying back down in semifowlers position    0302: Beltran placed    0321: SVE: 8/+1    0526: SVE: 8-/+1    0625: FERNANDEZ Guerrreo called unit for update on pt. Informed CNM of pt most recent sve of -/+1. Lova Mean Pt is reece every 2-4 minutes and is comfortable with an epidural. No new orders at this time. 0055:  Bedside and verbal report given to Tamika Velarde RN via SBAR, Kardex, and [facility]. Care relinquished at this time. Epidural pump settings verified with oncoming nurse.

## 2017-05-10 NOTE — PROGRESS NOTES
Called to L&D for CNM Sharp for pp bleeding. Pt received methergine and cytotec prior to my arrival. On my arrival bedside u/s showed no e/o retained placental tissue. The placenta was examined and noted to be intact. Pelvic examination revealed no e/o vaginal or cervical laceration. The uterine fundus is firm and below the umbilicus. At the completion of my examination there was noted to be minimal vaginal bleeding present. Plan: PO methergine x 24 hours, close monitoring for any recurrent abnormal pp bleeding. Check stat H&H due to approx. 800 mL pp blood loss.      Dylon Alvarado M.D.

## 2017-05-10 NOTE — ROUTINE PROCESS
TRANSFER - IN REPORT:    Verbal report received from Ilona Records RN (name) on Daniel Spencer  being received from L and D (unit) for routine progression of care      Report consisted of patients Situation, Background, Assessment and   Recommendations(SBAR). Information from the following report(s) SBAR, Intake/Output, MAR and Recent Results was reviewed with the receiving nurse. Opportunity for questions and clarification was provided. Assessment completed upon patients arrival to unit and care assumed. VSS. Oriented to room and unit. Advised to call for assistance to bathroom. IV infusing without diff. Denies needs.

## 2017-05-11 LAB
HCT VFR BLD AUTO: 32.6 % (ref 35–45)
HGB BLD-MCNC: 11.4 G/DL (ref 12–16)

## 2017-05-11 PROCEDURE — 65270000029 HC RM PRIVATE

## 2017-05-11 PROCEDURE — 36415 COLL VENOUS BLD VENIPUNCTURE: CPT | Performed by: OBSTETRICS & GYNECOLOGY

## 2017-05-11 PROCEDURE — 74011250637 HC RX REV CODE- 250/637: Performed by: NURSE PRACTITIONER

## 2017-05-11 PROCEDURE — 85018 HEMOGLOBIN: CPT | Performed by: OBSTETRICS & GYNECOLOGY

## 2017-05-11 PROCEDURE — 85014 HEMATOCRIT: CPT | Performed by: OBSTETRICS & GYNECOLOGY

## 2017-05-11 RX ORDER — IBUPROFEN 800 MG/1
800 TABLET ORAL
Qty: 50 TAB | Refills: 0 | Status: SHIPPED | OUTPATIENT
Start: 2017-05-11

## 2017-05-11 RX ADMIN — IBUPROFEN 800 MG: 400 TABLET, FILM COATED ORAL at 07:14

## 2017-05-11 RX ADMIN — IBUPROFEN 800 MG: 400 TABLET, FILM COATED ORAL at 16:32

## 2017-05-11 RX ADMIN — METHYLERGONOVINE MALEATE 200 MCG: 0.2 TABLET ORAL at 06:04

## 2017-05-11 RX ADMIN — METHYLERGONOVINE MALEATE 200 MCG: 0.2 TABLET ORAL at 12:34

## 2017-05-11 NOTE — PROGRESS NOTES
Bedside and Verbal shift change report given to ORVILLE Arambula (oncoming nurse) by Dayron Briones RN   (offgoing nurse). Report included the following information SBAR, Kardex, Intake/Output, MAR and Recent Results.

## 2017-05-11 NOTE — ADVANCED PRACTICE NURSE
5/11/2017  2:14 PM    Laboring Epidural Follow-up Note     Referring physician: Arash Guzman,*   Patient status post vaginal delivery with labor epidural    Visit Vitals    /85 (BP 1 Location: Left arm, BP Patient Position: At rest)    Pulse 79    Temp 36.7 °C (98.1 °F)    Resp 18    Ht 5' (1.524 m)    Wt 62.6 kg (138 lb)    SpO2 100%    Breastfeeding Yes    BMI 26.95 kg/m2                             Epidural removed by L&D staff  No sedation, pruritis noted. Adequate analgesia.   No obvious anesthesia complications          Clide Heimlich May-Such, CRNA

## 2017-05-11 NOTE — PROGRESS NOTES
Progress Note    Patient: James Mccrary MRN: [id number]     YOB: 1990  Age: 31 y.o. Subjective:     Postpartum Day: 1    The patient is feeling well. Pain is  well controlled with current medications. Baby is feeding via breast without difficulty. Urinary output is adequate. Objective:      No data found. General:    alert, no distress   Lochia:  appropriate   Uterine Fundus:   firm @ fingerbreath below umbilicus    Perineum:  Intact    DVT Evaluation:  No evidence of DVT seen on physical exam.     Lab/Data Review:  Recent Results (from the past 24 hour(s))   CBC W/O DIFF    Collection Time: 05/10/17  1:52 PM   Result Value Ref Range    WBC 17.2 (H) 4.6 - 13.2 K/uL    RBC 3.74 (L) 4.20 - 5.30 M/uL    HGB 13.0 12.0 - 16.0 g/dL    HCT 37.5 35.0 - 45.0 %    .3 (H) 74.0 - 97.0 FL    MCH 34.8 (H) 24.0 - 34.0 PG    MCHC 34.7 31.0 - 37.0 g/dL    RDW 13.4 11.6 - 14.5 %    PLATELET 811 104 - 581 K/uL    MPV 11.8 9.2 - 11.8 FL   HEMATOCRIT    Collection Time: 17  6:43 AM   Result Value Ref Range    HCT 32.6 (L) 35.0 - 45.0 %   HEMOGLOBIN    Collection Time: 17  6:43 AM   Result Value Ref Range    HGB 11.4 (L) 12.0 - 16.0 g/dL     All lab results for the last 24 hours reviewed. Assessment:     Delivery:     Plan:     Doing well postpartum vaginal delivery. Continue current postpartum care. Encouraged hydration, nutrition and ambulation. DC home tomorrow. Follow-up in office in 6 weeks. Call prn. Current Discharge Medication List      CONTINUE these medications which have NOT CHANGED    Details   PNV QA.33/MZQLUDW FUM/FOLIC AC (PRENATAL PO) Take  by mouth.      evening primrose oil (EVENING PRIMROSE) 500 mg cap Take  by mouth.              Signed By: Julio España CNM     May 11, 2017

## 2017-05-11 NOTE — PROGRESS NOTES
2048- Pt. Joined at bedside by father and baby. AAOx4. VSS. Will continue to monitor. Pain 5/10. Educated on pain management. Pain medication administered as ordered. Educated on plan of care. No further questions on concerns at this time. Funds firm at [facility], small rubra lochia. No clots noted. Assessment complete. Callbell within reach. Bed in lowest position.

## 2017-05-12 VITALS
OXYGEN SATURATION: 99 % | RESPIRATION RATE: 18 BRPM | TEMPERATURE: 97.6 F | SYSTOLIC BLOOD PRESSURE: 115 MMHG | HEART RATE: 92 BPM | WEIGHT: 138 LBS | HEIGHT: 60 IN | BODY MASS INDEX: 27.09 KG/M2 | DIASTOLIC BLOOD PRESSURE: 66 MMHG

## 2017-05-12 PROCEDURE — 74011250637 HC RX REV CODE- 250/637: Performed by: NURSE PRACTITIONER

## 2017-05-12 RX ADMIN — IBUPROFEN 800 MG: 400 TABLET, FILM COATED ORAL at 02:17

## 2017-05-12 RX ADMIN — IBUPROFEN 800 MG: 400 TABLET, FILM COATED ORAL at 17:13

## 2017-05-12 NOTE — LACTATION NOTE
Infant latched and able to take a few sucks, but very fussy. Set up with pump for jaundice and instructed on use. 1020 Fed 2 ml of expressed breast milk through syringe.     1155 Reviewed pumping and jaundice with mom per patient request.

## 2017-05-12 NOTE — PROGRESS NOTES
Patient discharged per order. Condition stable throughout shift. Discharge instructions explained and hard copies provided. Mom verbalized understanding. No further needs expressed at this time.

## 2017-05-12 NOTE — DISCHARGE SUMMARY
Obstetrical Discharge Summary     Name: Jessica Navas MRN: [id number]  SSN: [id number]    YOB: 1990  Age: 31 y.o. Sex: female      Admit Date: 2017    Discharge Date: 2017    Admitting Physician: Bony Del Cid MD     Attending Physician:  Bony Del Cid MD     Discharge Diagnoses:   Information for the patient's :  Alicia Lazo [[id number]]   Delivery of a 2.769 kg female infant via Vaginal, Spontaneous Delivery on 5/10/2017 at 12:16 PM  by . Apgars were 1 and 9. Additional Diagnoses:   Problem List as of 2017  Never Reviewed          Codes Class Noted - Resolved    Normal labor and delivery ICD-10-CM: O80  ICD-9-CM: 885  2017 - Present        Postpartum care and examination ICD-10-CM: Z39.2  ICD-9-CM: V24.2  2017 - Present        Pregnancy ICD-10-CM: Z33.1  ICD-9-CM: V22.2  5/10/2017 - Present              Lab Results   Component Value Date/Time    Rubella, External NEGATIVE 10/04/2016    GrBStrep, External negative 2017     Recent Labs      17   0643   HGB  11.4*       Hospital Course: Normal hospital course following the delivery. Patient Instructions:   Current Discharge Medication List      START taking these medications    Details   ibuprofen (MOTRIN) 800 mg tablet Take 1 Tab by mouth every eight (8) hours as needed. Qty: 50 Tab, Refills: 0         CONTINUE these medications which have NOT CHANGED    Details   PNV VE.57/EYEXXFO FUM/FOLIC AC (PRENATAL PO) Take  by mouth.      evening primrose oil (EVENING PRIMROSE) 500 mg cap Take  by mouth. Reference my discharge instructions.     Follow-up Appointments   Procedures    FOLLOW UP VISIT Appointment in: 6 Weeks     Standing Status:   Standing     Number of Occurrences:   1     Order Specific Question:   Appointment in     Answer:   6 Weeks        Signed By:  Shaquille Montanez CNM     May 12, 2017                       BST

## 2017-05-12 NOTE — DISCHARGE INSTRUCTIONS
POST DELIVERY DISCHARGE INSTRUCTIONS    Name: Jessica Gallo  YOB: 1990  Primary Diagnosis: Active Problems:    Pregnancy (5/10/2017)      Normal labor and delivery (5/11/2017)      Postpartum care and examination (5/11/2017)        General:     Diet/Diet Restrictions:  Eight 8-ounce glasses of fluid daily (water, juices); avoid excessive caffeine intake. Meals/snacks as desired which are high in fiber and carbohydrates and low in fat and cholesterol. Physical Activity / Restrictions / Safety:     Avoid heavy lifting, no more than the baby alone (not the baby in the car seat). Avoid intercourse until you are seen at your postpartum visit. No douching or tampon use. Check with obstetrician before starting or resuming an exercise program.         Discharge Instructions/Special Treatment/Home Care Needs:     Continue prenatal vitamins. Continue to use squirt bottle with warm water on your perineum after each bathroom use until bleeding stops. Call your doctor for the following:     Fever over 101 degrees by mouth. Vaginal bleeding that soaks two pads per hour for more than one hour. Red streaks or increased swelling of legs, painful red streaks on your breast.  If you feel extremely anxious or overwhelmed. If you have thoughts of harming yourself and/or your baby. Pain Management:     Pain Management:   Take Acetaminophen (Tylenol) or Ibuprofen (Advil, Motrin), as directed for pain. Use a warm Sitz bath 3 times daily to relieve perineal or hemorrhoidal discomfort. For hemorrhoidal discomfort, use Tucks and Anusol cream as needed and directed.     Follow-Up Care:     Appointment with MD:   Follow-up Appointments   Procedures    FOLLOW UP VISIT Appointment in: 6 Weeks     Standing Status:   Standing     Number of Occurrences:   1     Order Specific Question:   Appointment in     Answer:   6 Weeks     Telephone number: [phone]      Signed By: Ebony Hutton CNM

## 2017-05-12 NOTE — ROUTINE PROCESS
Bedside and Verbal shift change report given to KRIS Kearns RN (oncoming nurse) by BISHOP Irene RN (offgoing nurse). Report included the following information SBAR, Kardex and MAR.

## 2017-05-14 LAB
ABO + RH BLD: NORMAL
BLD PROD TYP BPU: NORMAL
BLD PROD TYP BPU: NORMAL
BLOOD GROUP ANTIBODIES SERPL: NORMAL
BPU ID: NORMAL
BPU ID: NORMAL
CALLED TO:,BCALL1: NORMAL
CROSSMATCH RESULT,%XM: NORMAL
CROSSMATCH RESULT,%XM: NORMAL
SPECIMEN EXP DATE BLD: NORMAL
STATUS OF UNIT,%ST: NORMAL
STATUS OF UNIT,%ST: NORMAL
UNIT DIVISION, %UDIV: 0
UNIT DIVISION, %UDIV: 0